# Patient Record
Sex: FEMALE | Race: BLACK OR AFRICAN AMERICAN
[De-identification: names, ages, dates, MRNs, and addresses within clinical notes are randomized per-mention and may not be internally consistent; named-entity substitution may affect disease eponyms.]

---

## 2020-11-19 PROBLEM — Z00.00 ENCOUNTER FOR PREVENTIVE HEALTH EXAMINATION: Status: ACTIVE | Noted: 2020-11-19

## 2020-12-01 ENCOUNTER — APPOINTMENT (OUTPATIENT)
Dept: VASCULAR SURGERY | Facility: CLINIC | Age: 50
End: 2020-12-01

## 2020-12-29 ENCOUNTER — APPOINTMENT (OUTPATIENT)
Dept: VASCULAR SURGERY | Facility: CLINIC | Age: 50
End: 2020-12-29
Payer: MEDICAID

## 2020-12-29 VITALS — BODY MASS INDEX: 35.32 KG/M2 | HEIGHT: 65 IN | WEIGHT: 212 LBS

## 2020-12-29 PROCEDURE — 99204 OFFICE O/P NEW MOD 45 MIN: CPT

## 2020-12-29 PROCEDURE — 93971 EXTREMITY STUDY: CPT

## 2020-12-29 PROCEDURE — 99072 ADDL SUPL MATRL&STAF TM PHE: CPT

## 2021-01-19 NOTE — ASSESSMENT
[FreeTextEntry1] : 50-year-old female with MS of anterior left thigh and back.  Both muscles are well-circumscribed and mobile.  Muscles are nontender.  Patient examined ultrasound finding is most likely represent lipoma.  She was advised to get formal imaging and assessment by general surgeon.\par \par \par Leg compression and elevation. \par Skin care with moisturizers. \par Follow up in 3 weeks or sooner as needed. \par \par \par

## 2021-01-19 NOTE — PROCEDURE
[FreeTextEntry1] : Ultrasound of both masses was performed demonstrating no increased vascularity.  Masses had dense and consistent with lipoma.

## 2021-01-19 NOTE — HISTORY OF PRESENT ILLNESS
[FreeTextEntry1] : Patient is a 50-year-old female with past medical history of fibroids presented for an evaluation of left upper extremity and back mass.  She noted the mass a few months ago and those got progressively worse.  They are nontender, well-circumscribed and mobile.  She had no similar symptoms before she has no symptoms or signs of infection.  No drainage.\par \par Patient also noted the anterior thigh of the left lower extremity mass that has been getting progressively worse over the course of months.  Again the mass is nontender, mobile, easily palpable.  With no signs of infection.  Patient also noted bilateral lower extremity swelling present and got progressively worse over time.  She has significant varicose veins of bilateral lower extremities.  She has not been fully compliant with compression socks but wears them intermittently.  She has no ulcerations and no cellulitis.  Her edema is associated with prolonged standing and ambulation and sitting.  Gets progressively worse during the course of the day.  Alleviated by leg elevation.  No ulcer

## 2021-01-19 NOTE — PHYSICAL EXAM
[JVD] : no jugular venous distention  [Normal Breath Sounds] : Normal breath sounds [Normal Heart Sounds] : normal heart sounds [2+] : left 2+ [Ankle Swelling (On Exam)] : present [Varicose Veins Of Lower Extremities] : present [] : not present [No Rash or Lesion] : No rash or lesion [Calm] : calm [de-identified] : NAD. Awake and alert [de-identified] : NCAT  [de-identified] : Soft, NT, ND. No guarding. No palpable masses. No HSM [de-identified] : no CVAT  [de-identified] : Normal muscle bulk and tone. FROM in all extremities.\par Palpable mobile mass of left Quad muscles. \par Palpable mobile mass of the upper back- well circumscribed.  [de-identified] : AAOx3, CN II-XII intact. Strength 5/5 in all 4 extremities. Sensation intact throughout.

## 2024-01-23 ENCOUNTER — OFFICE (OUTPATIENT)
Dept: URBAN - METROPOLITAN AREA CLINIC 86 | Facility: CLINIC | Age: 54
Setting detail: OPHTHALMOLOGY
End: 2024-01-23
Payer: MEDICAID

## 2024-01-23 DIAGNOSIS — H40.003: ICD-10-CM

## 2024-01-23 DIAGNOSIS — H52.4: ICD-10-CM

## 2024-01-23 DIAGNOSIS — H25.13: ICD-10-CM

## 2024-01-23 PROCEDURE — 92250 FUNDUS PHOTOGRAPHY W/I&R: CPT | Performed by: OPHTHALMOLOGY

## 2024-01-23 PROCEDURE — 92004 COMPRE OPH EXAM NEW PT 1/>: CPT | Performed by: OPHTHALMOLOGY

## 2024-01-23 PROCEDURE — 92015 DETERMINE REFRACTIVE STATE: CPT | Performed by: OPHTHALMOLOGY

## 2024-01-23 ASSESSMENT — REFRACTION_AUTOREFRACTION
OS_AXIS: 170
OD_AXIS: 015
OD_CYLINDER: +1.75
OS_CYLINDER: +1.50
OD_SPHERE: -2.75
OS_SPHERE: -3.50

## 2024-01-23 ASSESSMENT — CONFRONTATIONAL VISUAL FIELD TEST (CVF)
OS_FINDINGS: FULL
OD_FINDINGS: FULL

## 2024-01-23 ASSESSMENT — SPHEQUIV_DERIVED
OS_SPHEQUIV: -2.75
OS_SPHEQUIV: -2.5
OD_SPHEQUIV: -1.75
OD_SPHEQUIV: -1.875

## 2024-01-23 ASSESSMENT — REFRACTION_MANIFEST
OD_ADD: +1.75
OD_CYLINDER: +1.00
OS_CYLINDER: +1.00
OD_SPHERE: -2.25
OS_SPHERE: -3.00
OD_VA1: 20/20
OD_AXIS: 015
OS_AXIS: 170
OS_ADD: +1.75
OS_VA1: 20/20